# Patient Record
Sex: FEMALE | Race: ASIAN | NOT HISPANIC OR LATINO | URBAN - METROPOLITAN AREA
[De-identification: names, ages, dates, MRNs, and addresses within clinical notes are randomized per-mention and may not be internally consistent; named-entity substitution may affect disease eponyms.]

---
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
## 2024-04-29 ENCOUNTER — EMERGENCY (EMERGENCY)
Facility: HOSPITAL | Age: 24
LOS: 0 days | Discharge: HOME | End: 2024-04-29
Attending: STUDENT IN AN ORGANIZED HEALTH CARE EDUCATION/TRAINING PROGRAM
Payer: COMMERCIAL

## 2024-04-29 VITALS
TEMPERATURE: 98 F | HEIGHT: 64 IN | OXYGEN SATURATION: 99 % | SYSTOLIC BLOOD PRESSURE: 139 MMHG | DIASTOLIC BLOOD PRESSURE: 90 MMHG | WEIGHT: 119.93 LBS | HEART RATE: 87 BPM | RESPIRATION RATE: 14 BRPM

## 2024-04-29 DIAGNOSIS — R07.82 INTERCOSTAL PAIN: ICD-10-CM

## 2024-04-29 DIAGNOSIS — R07.9 CHEST PAIN, UNSPECIFIED: ICD-10-CM

## 2024-04-29 PROCEDURE — 99284 EMERGENCY DEPT VISIT MOD MDM: CPT

## 2024-04-29 RX ORDER — LIDOCAINE 4 G/100G
1 CREAM TOPICAL
Qty: 4 | Refills: 0
Start: 2024-04-29 | End: 2024-05-08

## 2024-04-29 RX ORDER — IBUPROFEN 200 MG
600 TABLET ORAL ONCE
Refills: 0 | Status: COMPLETED | OUTPATIENT
Start: 2024-04-29 | End: 2024-04-29

## 2024-04-29 RX ORDER — LIDOCAINE 4 G/100G
1 CREAM TOPICAL ONCE
Refills: 0 | Status: COMPLETED | OUTPATIENT
Start: 2024-04-29 | End: 2024-04-29

## 2024-04-29 RX ADMIN — LIDOCAINE 1 PATCH: 4 CREAM TOPICAL at 03:48

## 2024-04-29 RX ADMIN — Medication 600 MILLIGRAM(S): at 03:48

## 2024-04-29 NOTE — ED PROVIDER NOTE - PHYSICAL EXAMINATION
General: No acute distress, mentation at baseline,  well nourished, well developed  HEENT: NCAT, Neck supple without meningismus, PERRL, no conjunctival injection  Lungs: CTAB, No wheeze or crackles, No retractions, No increased work of breathing  Heart: S1S2 RRR, No M/R/G, Pules equal Bilaterally in upper and lower extremities distally. R intercostal muscle TTP between 5th and 6th rib on anterior aspect, no bony tednerness   Abd: soft, NT/ND, No guarding, No rebound.  No hernias, no palpable masses.  Extrem: FROM in all joints, no gross deformities appreciated, no significant edema noted, No ulcers. Cap refil < 2sec.  Skin: No rash noted, warm dry.  Neuro:  Grossly normal.  No difficulty ambulating. No focal deficits.  Psychiatric: Appropriate mood and affect.

## 2024-04-29 NOTE — ED PROVIDER NOTE - CLINICAL SUMMARY MEDICAL DECISION MAKING FREE TEXT BOX
24 y/o F no sig pmh p/w rib pain x 3 hrs. denies preceding trauma, states she had worked out earlier in the day. developed R lower rib pain. denies SOB or CP. denies fever cough chills. states pain is reproducible    TTP along intercostal muscle, no bony tenderness ,lungs and CV clear    will treat likely intercostal sprain with lidoderm and ibuprofen and rx with same. strict return precautions given. told pt to look for signs of rash and return if so for possible treatment of shingles although low suspicion at this time

## 2024-04-29 NOTE — ED ADULT NURSE NOTE - OBJECTIVE STATEMENT
Covering for primary RN, Khloe. Pt AAOx4. 23 year old female presents to ED with complaint of pain on right rib area that began a few hours ago. Denies chest pain, shortness of breath, nausea, vomiting, trauma. Respirations equal and unlabored. No acute distress noted at this time.

## 2024-04-29 NOTE — ED PROVIDER NOTE - PATIENT PORTAL LINK FT
You can access the FollowMyHealth Patient Portal offered by [facility] by registering at the following website: [email or web address]. By joining [employer]’s FollowMyHealth portal, you will also be able to view your health information using other applications (apps) compatible with our system.

## 2024-06-01 ENCOUNTER — NON-APPOINTMENT (OUTPATIENT)
Age: 24
End: 2024-06-01

## 2024-10-07 ENCOUNTER — NON-APPOINTMENT (OUTPATIENT)
Age: 24
End: 2024-10-07

## 2025-02-14 NOTE — ED ADULT NURSE NOTE - BEFAST ARM SIDE DRIFT
Patient states that her rheumatologist from [facility] has left and is requesting her PCP to  the refill.     Reason for call:   [x] Refill   [] Prior Auth  [] Other:     Office:   [x] PCP/Provider - [facility]  Authorized By: Indra Membreno DO    [] Specialty/Provider -     Medication: amitriptyline (ELAVIL) 10 mg tablet    Dose/Frequency:  Take 10 mg by mouth daily at bedtime    Quantity: 30 tablets     Pharmacy: [facility]/pharmacy #[id number] [place], [place] - [id number]     Does the patient have enough for 3 days?   [x] Yes   [] No - Send as HP to POD    
No